# Patient Record
Sex: MALE | Race: WHITE | Employment: OTHER | URBAN - METROPOLITAN AREA
[De-identification: names, ages, dates, MRNs, and addresses within clinical notes are randomized per-mention and may not be internally consistent; named-entity substitution may affect disease eponyms.]

---

## 2017-01-15 ENCOUNTER — APPOINTMENT (OUTPATIENT)
Dept: GENERAL RADIOLOGY | Facility: CLINIC | Age: 52
End: 2017-01-15
Attending: FAMILY MEDICINE
Payer: COMMERCIAL

## 2017-01-15 ENCOUNTER — HOSPITAL ENCOUNTER (EMERGENCY)
Facility: CLINIC | Age: 52
Discharge: HOME | End: 2017-01-15
Attending: FAMILY MEDICINE | Admitting: FAMILY MEDICINE
Payer: COMMERCIAL

## 2017-01-15 VITALS
WEIGHT: 200 LBS | SYSTOLIC BLOOD PRESSURE: 165 MMHG | TEMPERATURE: 96.8 F | HEART RATE: 99 BPM | BODY MASS INDEX: 32.14 KG/M2 | OXYGEN SATURATION: 98 % | RESPIRATION RATE: 18 BRPM | DIASTOLIC BLOOD PRESSURE: 108 MMHG | HEIGHT: 66 IN

## 2017-01-15 DIAGNOSIS — R07.89 CHEST WALL PAIN: ICD-10-CM

## 2017-01-15 DIAGNOSIS — R07.89 ATYPICAL CHEST PAIN: ICD-10-CM

## 2017-01-15 DIAGNOSIS — I10 ESSENTIAL HYPERTENSION: ICD-10-CM

## 2017-01-15 LAB
ALBUMIN SERPL-MCNC: 4.3 G/DL (ref 3.4–5)
ALP SERPL-CCNC: 136 U/L (ref 40–150)
ALT SERPL W P-5'-P-CCNC: 42 U/L (ref 0–70)
ANION GAP SERPL CALCULATED.3IONS-SCNC: 7 MMOL/L (ref 3–14)
AST SERPL W P-5'-P-CCNC: 23 U/L (ref 0–45)
BASOPHILS # BLD AUTO: 0 10E9/L (ref 0–0.2)
BASOPHILS NFR BLD AUTO: 0.3 %
BILIRUB SERPL-MCNC: 0.5 MG/DL (ref 0.2–1.3)
BUN SERPL-MCNC: 16 MG/DL (ref 7–30)
CALCIUM SERPL-MCNC: 8.3 MG/DL (ref 8.5–10.1)
CHLORIDE SERPL-SCNC: 101 MMOL/L (ref 94–109)
CO2 SERPL-SCNC: 33 MMOL/L (ref 20–32)
CREAT SERPL-MCNC: 1.13 MG/DL (ref 0.66–1.25)
D DIMER PPP FEU-MCNC: 0.3 UG/ML FEU (ref 0–0.5)
DIFFERENTIAL METHOD BLD: NORMAL
EOSINOPHIL # BLD AUTO: 0.1 10E9/L (ref 0–0.7)
EOSINOPHIL NFR BLD AUTO: 1.2 %
ERYTHROCYTE [DISTWIDTH] IN BLOOD BY AUTOMATED COUNT: 13.1 % (ref 10–15)
ERYTHROCYTE [SEDIMENTATION RATE] IN BLOOD BY WESTERGREN METHOD: 6 MM/H (ref 0–20)
GFR SERPL CREATININE-BSD FRML MDRD: 68 ML/MIN/1.7M2
GLUCOSE SERPL-MCNC: 95 MG/DL (ref 70–99)
HCT VFR BLD AUTO: 50.6 % (ref 40–53)
HGB BLD-MCNC: 17.6 G/DL (ref 13.3–17.7)
IMM GRANULOCYTES # BLD: 0 10E9/L (ref 0–0.4)
IMM GRANULOCYTES NFR BLD: 0.1 %
LIPASE SERPL-CCNC: 117 U/L (ref 73–393)
LYMPHOCYTES # BLD AUTO: 2.6 10E9/L (ref 0.8–5.3)
LYMPHOCYTES NFR BLD AUTO: 25.2 %
MCH RBC QN AUTO: 32.7 PG (ref 26.5–33)
MCHC RBC AUTO-ENTMCNC: 34.8 G/DL (ref 31.5–36.5)
MCV RBC AUTO: 94 FL (ref 78–100)
MONOCYTES # BLD AUTO: 1.2 10E9/L (ref 0–1.3)
MONOCYTES NFR BLD AUTO: 11.4 %
NEUTROPHILS # BLD AUTO: 6.3 10E9/L (ref 1.6–8.3)
NEUTROPHILS NFR BLD AUTO: 61.8 %
PLATELET # BLD AUTO: 265 10E9/L (ref 150–450)
POTASSIUM SERPL-SCNC: 3.6 MMOL/L (ref 3.4–5.3)
PROT SERPL-MCNC: 8.3 G/DL (ref 6.8–8.8)
RBC # BLD AUTO: 5.39 10E12/L (ref 4.4–5.9)
SODIUM SERPL-SCNC: 141 MMOL/L (ref 133–144)
TROPONIN I SERPL-MCNC: NORMAL UG/L (ref 0–0.04)
WBC # BLD AUTO: 10.1 10E9/L (ref 4–11)

## 2017-01-15 PROCEDURE — 99285 EMERGENCY DEPT VISIT HI MDM: CPT | Mod: 25 | Performed by: FAMILY MEDICINE

## 2017-01-15 PROCEDURE — 96374 THER/PROPH/DIAG INJ IV PUSH: CPT

## 2017-01-15 PROCEDURE — 80053 COMPREHEN METABOLIC PANEL: CPT | Performed by: FAMILY MEDICINE

## 2017-01-15 PROCEDURE — 99285 EMERGENCY DEPT VISIT HI MDM: CPT | Mod: 25

## 2017-01-15 PROCEDURE — 85652 RBC SED RATE AUTOMATED: CPT | Performed by: FAMILY MEDICINE

## 2017-01-15 PROCEDURE — 25000132 ZZH RX MED GY IP 250 OP 250 PS 637: Performed by: FAMILY MEDICINE

## 2017-01-15 PROCEDURE — 84484 ASSAY OF TROPONIN QUANT: CPT | Performed by: FAMILY MEDICINE

## 2017-01-15 PROCEDURE — 25000125 ZZHC RX 250: Performed by: FAMILY MEDICINE

## 2017-01-15 PROCEDURE — 71020 XR CHEST 2 VW: CPT | Mod: TC

## 2017-01-15 PROCEDURE — 85379 FIBRIN DEGRADATION QUANT: CPT | Performed by: FAMILY MEDICINE

## 2017-01-15 PROCEDURE — 93010 ELECTROCARDIOGRAM REPORT: CPT | Performed by: FAMILY MEDICINE

## 2017-01-15 PROCEDURE — 83690 ASSAY OF LIPASE: CPT | Performed by: FAMILY MEDICINE

## 2017-01-15 PROCEDURE — 85025 COMPLETE CBC W/AUTO DIFF WBC: CPT | Performed by: FAMILY MEDICINE

## 2017-01-15 PROCEDURE — 93005 ELECTROCARDIOGRAM TRACING: CPT

## 2017-01-15 RX ORDER — KETOROLAC TROMETHAMINE 30 MG/ML
30 INJECTION, SOLUTION INTRAMUSCULAR; INTRAVENOUS ONCE
Status: COMPLETED | OUTPATIENT
Start: 2017-01-15 | End: 2017-01-15

## 2017-01-15 RX ORDER — LIDOCAINE 40 MG/G
CREAM TOPICAL
Status: DISCONTINUED | OUTPATIENT
Start: 2017-01-15 | End: 2017-01-15 | Stop reason: HOSPADM

## 2017-01-15 RX ORDER — TRIAMTERENE AND HYDROCHLOROTHIAZIDE 37.5; 25 MG/1; MG/1
1 CAPSULE ORAL DAILY
COMMUNITY

## 2017-01-15 RX ORDER — ASPIRIN 81 MG/1
324 TABLET, CHEWABLE ORAL ONCE
Status: COMPLETED | OUTPATIENT
Start: 2017-01-15 | End: 2017-01-15

## 2017-01-15 RX ADMIN — KETOROLAC TROMETHAMINE 30 MG: 30 INJECTION, SOLUTION INTRAMUSCULAR at 18:37

## 2017-01-15 RX ADMIN — ASPIRIN 81 MG CHEWABLE TABLET 324 MG: 81 TABLET CHEWABLE at 18:26

## 2017-01-15 NOTE — ED AVS SNAPSHOT
[facility] Emergency Department    [address] [place] [address]    Phone:  [phone]    Fax:  [phone]                                       Houston Castellon   MRN: [id number]    Department:  [facility] Emergency Department   Date of Visit:  1/15/2017           After Visit Summary Signature Page     I have received my discharge instructions, and my questions have been answered. I have discussed any challenges I see with this plan with the nurse or doctor.    ..........................................................................................................................................  Patient/Patient Representative Signature      ..........................................................................................................................................  Patient Representative Print Name and Relationship to Patient    ..................................................               ................................................  Date                                            Time    ..........................................................................................................................................  Reviewed by Signature/Title    ...................................................              ..............................................  Date                                                            Time

## 2017-01-15 NOTE — ED AVS SNAPSHOT
[facility] Emergency Department    [address]     [place] [address]    Phone:  [phone]    Fax:  [phone]                                       Houston Castellon   MRN: [id number]    Department:  [facility] Emergency Department   Date of Visit:  1/15/2017           Patient Information     Date Of Birth          1965        Your diagnoses for this visit were:     Atypical chest pain     Chest wall pain     Essential hypertension        You were seen by Samuel Marte MD and Samir Ferro MD.      Follow-up Information     Follow up with John Chow MD.    Specialty:  Emergency Medicine    Why:  1-2 weeks    Contact information:    [facility]  [address] [address]  [phone]          Discharge Instructions       Your EKG, chest x-ray and lab work were all reassuring tonight.  This appears to be coming from your chest wall.  This is usually treated with anti-inflammatory such as naproxen or ibuprofen.  Ice or heat may also be helpful.    Try to remember to take at least 10 deep breaths every hour while awake.  Recheck your blood pressure in clinic this week or next.  It was a pleasure visiting with both of you this evening.  I hope this settles down quickly for you.  Stay safe!    Thank you for choosing [facility]. We appreciate the opportunity to meet your urgent medical needs. Please let us know if we could have done anything to make your stay more satisfying.    After discharge, please closely monitor for any new or worsening symptoms. Return to the Emergency Department if you develop any acute worsening signs or symptoms.    If you had lab work, cultures or imaging studies done during your stay, the final results may still be pending. We will call you if your plan of care needs to change. However, if you are not improving as expected, please follow up with your primary care provider or clinic.     Start any prescription  medications that were prescribed to you and take them as directed.     Please see additional handouts that may be pertinent to your condition.        Chest Wall Pain: Costochondritis    The chest pain that you have had today is caused by costochondritis. This condition is caused by an inflammation of the cartilage joining your ribs to your breastbone. It is not caused by heart or lung problems. The inflammation may have been brought on by a blow to the chest, lifting heavy objects, intense exercise, or an illness that made you cough and sneeze. It often occurs during times of emotional stress. It can be painful, but it is not dangerous. It usually goes away in 1 to 2 weeks. But it may happen again. Rarely, a more serious condition may cause symptoms similar to costochondritis. That s why it s important to watch for the warning signs listed below.  Home care  Follow these guidelines when caring for yourself at home:    If you feel that emotional stress is a cause of your condition, try to figure out the sources of that stress. It may not be obvious! Learn ways to deal with the stress in your life. This can include regular exercise, muscle relaxation, meditation, or simply taking time out for yourself. For more information about this, talk with your health care provider. Or go to your local library and look at books on  stress reduction.     You may use acetaminophen or ibuprofen to control pain, unless another pain medicine was prescribed. If you have liver disease or ever had a stomach ulcer, talk with your health care provider before using these medicines.    You can also help ease pain by using a hot, wet compress or heating pad. Use this with or without a medicated skin cream that helps relieves pain.    Do stretching exercise as advised by your provider.    Take any prescribed medicines as directed.  Follow-up care  Follow up with your health care provider, or as advised, if you do not start to get better in the  next 2 days.  When to seek medical advice  Call your health care provider right away if any of these occur:    A change in the type of pain. Call if it feels different, becomes more serious, lasts longer, or spreads into your shoulder, arm, neck, jaw, or back.    Shortness of breath or pain gets worse when you breathe    Weakness, dizziness, or fainting    Cough with dark-colored sputum (phlegm) or blood    Abdominal pain    Dark red or black stools    Fever of 100.4 F (38 C) or higher, or as directed by your health care provider    4331-2719 [employer]. [address] [place], [place] [address]. All rights reserved. This information is not intended as a substitute for professional medical care. Always follow your healthcare professional's instructions.          24 Hour Appointment Hotline       To make an appointment at any [facility], call [phone] ([phone]). If you don't have a family doctor or clinic, we will help you find one. [facility] clinics are conveniently located to serve the needs of you and your family.             Review of your medicines      Our records show that you are taking the medicines listed below. If these are incorrect, please call your family doctor or clinic.        Dose / Directions Last dose taken    AMITRIPTYLINE HCL PO        Refills:  0        PRILOSEC PO        Refills:  0        triamterene-hydrochlorothiazide 37.5-25 MG per capsule   Commonly known as:  DYAZIDE   Dose:  1 capsule        Take 1 capsule by mouth daily   Refills:  0                Procedures and tests performed during your visit     CBC with platelets differential    Comprehensive metabolic panel    D dimer quantitative    EKG 12-lead, tracing only    Erythrocyte sedimentation rate auto    Lipase    Peripheral IV catheter    Troponin I    XR Chest 2 Views      Orders Needing Specimen Collection     None      Pending Results     No orders found from 1/14/2017 to 1/16/2017.           "  Pending Culture Results     No orders found from 2017 to 2017.            Thank you for choosing [facility]       Thank you for choosing [facility] for your care. Our goal is always to provide you with excellent care. Hearing back from our patients is one way we can continue to improve our services. Please take a few minutes to complete the written survey that you may receive in the mail after you visit with us. Thank you!        [employer] Information     [employer] lets you send messages to your doctor, view your test results, renew your prescriptions, schedule appointments and more. To sign up, go to [email or web address] . Click on \"Log in\" on the left side of the screen, which will take you to the Welcome page. Then click on \"Sign up Now\" on the right side of the page.     You will be asked to enter the access code listed below, as well as some personal information. Please follow the directions to create your username and password.     Your access code is: [email or web address]  Expires: 4/15/2017  8:41 PM     Your access code will  in 90 days. If you need help or a new code, please call your [facility] clinic or [phone].        Care EveryWhere ID     This is your Care EveryWhere ID. This could be used by other organizations to access your [facility] medical records  [id number]        After Visit Summary       This is your record. Keep this with you and show to your community pharmacist(s) and doctor(s) at your next visit.                  "

## 2017-01-16 NOTE — DISCHARGE INSTRUCTIONS
Your EKG, chest x-ray and lab work were all reassuring tonight.  This appears to be coming from your chest wall.  This is usually treated with anti-inflammatory such as naproxen or ibuprofen.  Ice or heat may also be helpful.    Try to remember to take at least 10 deep breaths every hour while awake.  Recheck your blood pressure in clinic this week or next.  It was a pleasure visiting with both of you this evening.  I hope this settles down quickly for you.  Stay safe!    Thank you for choosing [facility]. We appreciate the opportunity to meet your urgent medical needs. Please let us know if we could have done anything to make your stay more satisfying.    After discharge, please closely monitor for any new or worsening symptoms. Return to the Emergency Department if you develop any acute worsening signs or symptoms.    If you had lab work, cultures or imaging studies done during your stay, the final results may still be pending. We will call you if your plan of care needs to change. However, if you are not improving as expected, please follow up with your primary care provider or clinic.     Start any prescription medications that were prescribed to you and take them as directed.     Please see additional handouts that may be pertinent to your condition.        Chest Wall Pain: Costochondritis    The chest pain that you have had today is caused by costochondritis. This condition is caused by an inflammation of the cartilage joining your ribs to your breastbone. It is not caused by heart or lung problems. The inflammation may have been brought on by a blow to the chest, lifting heavy objects, intense exercise, or an illness that made you cough and sneeze. It often occurs during times of emotional stress. It can be painful, but it is not dangerous. It usually goes away in 1 to 2 weeks. But it may happen again. Rarely, a more serious condition may cause symptoms similar to costochondritis. That s why  it s important to watch for the warning signs listed below.  Home care  Follow these guidelines when caring for yourself at home:    If you feel that emotional stress is a cause of your condition, try to figure out the sources of that stress. It may not be obvious! Learn ways to deal with the stress in your life. This can include regular exercise, muscle relaxation, meditation, or simply taking time out for yourself. For more information about this, talk with your health care provider. Or go to your local library and look at books on  stress reduction.     You may use acetaminophen or ibuprofen to control pain, unless another pain medicine was prescribed. If you have liver disease or ever had a stomach ulcer, talk with your health care provider before using these medicines.    You can also help ease pain by using a hot, wet compress or heating pad. Use this with or without a medicated skin cream that helps relieves pain.    Do stretching exercise as advised by your provider.    Take any prescribed medicines as directed.  Follow-up care  Follow up with your health care provider, or as advised, if you do not start to get better in the next 2 days.  When to seek medical advice  Call your health care provider right away if any of these occur:    A change in the type of pain. Call if it feels different, becomes more serious, lasts longer, or spreads into your shoulder, arm, neck, jaw, or back.    Shortness of breath or pain gets worse when you breathe    Weakness, dizziness, or fainting    Cough with dark-colored sputum (phlegm) or blood    Abdominal pain    Dark red or black stools    Fever of 100.4 F (38 C) or higher, or as directed by your health care provider    1010-2870 [employer]. [address] [place] [address], [place] [address]. All rights reserved. This information is not intended as a substitute for professional medical care. Always follow your healthcare professional's instructions.

## 2017-01-16 NOTE — ED NOTES
Pt here with chest pain since 0430 this AM. Has been using a Nitro patch for tendonitis and took it off today and now had pain today.

## 2017-01-16 NOTE — ED PROVIDER NOTES
[facility] ED Provider Note   CC:     Chief Complaint   Patient presents with     Chest Pain     HPI:  Houston Castellon is a 51 year old male who presented to the emergency department with acute onset of left lateral chest pain which started this morning on his way to work.  Patient was driving to work when he developed a sharp pain along the lateral left chest wall.  Pain was fairly constant but worse with deep breathing.  Over the last several hours, the pain started to involve the lower chest across the front.  Pain did not radiate into the neck, jaw, or arm.  The pain along the left lateral chest wall does radiate slightly into the back, and is really exacerbated by deep breathing.  Patient feels slightly diaphoretic, but does not have a cough, shortness of breath or nausea.  He has not had any recent calf pain or ankle swelling.  Patient has no known history of diabetes or hyperlipidemia.  He has hypertension and takes amlodipine and potassium supplementation for a history of low potassium levels.  He also takes omeprazole and amitriptyline.  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  He does not ride in the car throughout the day.  He has no prior history of heart attacks, blood clots, and has not this type pain before.  He does not have any upper respiratory symptoms.  He denies fever, chills, cough, nausea, abdominal pain, changes in bowel habits, problems with urination.  He denies any tobacco, alcohol or illicit drug use except for an occasional cigar.  Patient has had a recent right elbow tendinitis, with a cortisone injection and application of a quarter patch of nitroglycerin to help increase circulation to the area.  Additional past medical history of T7 through T9 thoracic vertebral injury when he was in the  which causes chronic pain.  Past surgeries include  "cholecystectomy, skin grafting of the left elbow, and inguinal hernia repair.  Family history significant for his mother dying at age 45 of breast cancer, and his father dying at an early age of COPD.  Patient has not had any recent exertional symptoms.  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    Problem List:  There are no active problems to display for this patient.      MEDS:   Previous Medications    AMITRIPTYLINE HCL PO        AMLODIPINE BESYLATE PO        OMEPRAZOLE (PRILOSEC PO)           ALLERGIES:    Allergies   Allergen Reactions     Esgic [Butalbital-Apap-Caffeine] Other (See Comments)     Jaw locked         Past medical, surgical, family and social histories, triage and nursing notes were all reviewed.    Review of Systems   All other systems were reviewed and are negative    Physical Exam     Vitals were reviewed  Patient Vitals for the past 8 hrs:   BP Temp Temp src Pulse Heart Rate Resp SpO2 Height Weight   01/15/17 1832 (!) 152/95 mmHg - - - 98 - 94 % - -   01/15/17 1814 (!) 169/104 mmHg 96.8  F (36  C) Temporal 99 - 16 97 % 1.676 m (5' 6\") 90.719 kg (200 lb)     GENERAL APPEARANCE: Alert, mild distress due to pain  FACE: normal facies  EYES: Pupils are equal  HENT: normal external exam  NECK: no adenopathy or asymmetry  RESP: normal respiratory effort; clear breath sounds bilaterally  CHEST: Left lateral chest wall tenderness just below the axilla; no appreciable rash  CV: regular rate and rhythm; no significant murmurs, gallops or rubs  ABD: soft, diffuse epigastric tenderness; no rebound or guarding; bowel sounds are normal  MS: no gross deformities noted; normal muscle tone.  EXT: No calf tenderness or pitting edema  SKIN: Slightly diaphoretic  NEURO: no facial droop; no focal deficits, speech is normal  PSYCH: normal mood and affect      Available Lab/Imaging Results     Results for orders placed or performed during the hospital encounter of 01/15/17 (from the past 24 hour(s)) "   CBC with platelets differential   Result Value Ref Range    WBC 10.1 4.0 - 11.0 10e9/L    RBC Count 5.39 4.4 - 5.9 10e12/L    Hemoglobin 17.6 13.3 - 17.7 g/dL    Hematocrit 50.6 40.0 - 53.0 %    MCV 94 78 - 100 fl    MCH 32.7 26.5 - 33.0 pg    MCHC 34.8 31.5 - 36.5 g/dL    RDW 13.1 10.0 - 15.0 %    Platelet Count 265 150 - 450 10e9/L    Diff Method Automated Method     % Neutrophils 61.8 %    % Lymphocytes 25.2 %    % Monocytes 11.4 %    % Eosinophils 1.2 %    % Basophils 0.3 %    % Immature Granulocytes 0.1 %    Absolute Neutrophil 6.3 1.6 - 8.3 10e9/L    Absolute Lymphocytes 2.6 0.8 - 5.3 10e9/L    Absolute Monocytes 1.2 0.0 - 1.3 10e9/L    Absolute Eosinophils 0.1 0.0 - 0.7 10e9/L    Absolute Basophils 0.0 0.0 - 0.2 10e9/L    Abs Immature Granulocytes 0.0 0 - 0.4 10e9/L   XR Chest 2 Views    Narrative    CHEST TWO VIEWS 1/15/2017 6:46 PM     HISTORY: Chest pain.    COMPARISON: None.    FINDINGS: There are no acute infiltrates. The cardiac silhouette is  not enlarged. Pulmonary vasculature is unremarkable.      Impression    IMPRESSION: No acute disease.     EKG: Normal sinus rhythm with heart rate of 94.  Incomplete right bundle branch block.  No acute ST-T wave changes.  Normal intervals and axis.  No previous EKG for comparison.      Impression     Final diagnoses:   Atypical chest pain       ED Course & Medical Decision Making   Houston Castellon is a 51 year old male who presented to the emergency department with an atypical sharp chest pain that started this morning along the left lateral chest wall.  Patient states that the pain is associated with deep breathing, making the pain more intense.  His pain level currently is 3/10, but with deep breathing the pain is as bad as 8/10.  Patient started to develop epigastric or lower chest pain a few hours ago.  Patient has never had this type pain before.  Patient was seen shortly after arrival.  Temperature is 96.8 and blood pressure is 169/104.  Heart rate is 99,  oxygen saturation of 99%.  Patient does have some reproducible lateral chest wall tenderness as well as epigastric tenderness.  Patient received aspirin 324 mg, and a dose of Toradol.  His initial EKG revealed no acute ST-T wave changes.  CBC, comprehensive metabolic panel, troponin, ESR, lipase level, and chest x-ray been ordered.  Patient will be signed out to Dr. Ferro at the change of shift.            This note was completed in part using Dragon voice recognition, and may contain word and grammatical errors.         Samuel Marte MD  01/15/17 1901

## 2017-01-16 NOTE — ED PROVIDER NOTES
"Patient was signed out to me at change of shift by Dr. Marte to follow up on his labs and CXR results.    From Dr Marte's note:    \"Houston Castellon is a 51 year old male who presented to the emergency department with an atypical sharp chest pain that started this morning along the left lateral chest wall.  Patient states that the pain is associated with deep breathing, making the pain more intense.  His pain level currently is 3/10, but with deep breathing the pain is as bad as 8/10.  Patient started to develop epigastric or lower chest pain a few hours ago.  Patient has never had this type pain before.  Patient was seen shortly after arrival.  Temperature is 96.8 and blood pressure is 169/104.  Heart rate is 99, oxygen saturation of 99%.  Patient does have some reproducible lateral chest wall tenderness as well as epigastric tenderness.  Patient received aspirin 324 mg, and a dose of Toradol.  His initial EKG revealed no acute ST-T wave changes.  CBC, comprehensive metabolic panel, troponin, ESR, lipase level, and chest x-ray been ordered.  Patient will be signed out to Dr. Ferro at the change of shift.\"    Results for orders placed or performed during the hospital encounter of 01/15/17 (from the past 24 hour(s))   CBC with platelets differential   Result Value Ref Range    WBC 10.1 4.0 - 11.0 10e9/L    RBC Count 5.39 4.4 - 5.9 10e12/L    Hemoglobin 17.6 13.3 - 17.7 g/dL    Hematocrit 50.6 40.0 - 53.0 %    MCV 94 78 - 100 fl    MCH 32.7 26.5 - 33.0 pg    MCHC 34.8 31.5 - 36.5 g/dL    RDW 13.1 10.0 - 15.0 %    Platelet Count 265 150 - 450 10e9/L    Diff Method Automated Method     % Neutrophils 61.8 %    % Lymphocytes 25.2 %    % Monocytes 11.4 %    % Eosinophils 1.2 %    % Basophils 0.3 %    % Immature Granulocytes 0.1 %    Absolute Neutrophil 6.3 1.6 - 8.3 10e9/L    Absolute Lymphocytes 2.6 0.8 - 5.3 10e9/L    Absolute Monocytes 1.2 0.0 - 1.3 10e9/L    Absolute Eosinophils 0.1 0.0 - 0.7 10e9/L    Absolute Basophils 0.0 " 0.0 - 0.2 10e9/L    Abs Immature Granulocytes 0.0 0 - 0.4 10e9/L   Comprehensive metabolic panel   Result Value Ref Range    Sodium 141 133 - 144 mmol/L    Potassium 3.6 3.4 - 5.3 mmol/L    Chloride 101 94 - 109 mmol/L    Carbon Dioxide 33 (H) 20 - 32 mmol/L    Anion Gap 7 3 - 14 mmol/L    Glucose 95 70 - 99 mg/dL    Urea Nitrogen 16 7 - 30 mg/dL    Creatinine 1.13 0.66 - 1.25 mg/dL    GFR Estimate 68 >60 mL/min/1.7m2    GFR Estimate If Black 82 >60 mL/min/1.7m2    Calcium 8.3 (L) 8.5 - 10.1 mg/dL    Bilirubin Total 0.5 0.2 - 1.3 mg/dL    Albumin 4.3 3.4 - 5.0 g/dL    Protein Total 8.3 6.8 - 8.8 g/dL    Alkaline Phosphatase 136 40 - 150 U/L    ALT 42 0 - 70 U/L    AST 23 0 - 45 U/L   Troponin I   Result Value Ref Range    Troponin I ES  0.000 - 0.045 ug/L     <0.015  The 99th percentile for upper reference range is 0.045 ug/L.  Troponin values in   the range of 0.045 - 0.120 ug/L may be associated with risks of adverse   clinical events.     Erythrocyte sedimentation rate auto   Result Value Ref Range    Sed Rate 6 0 - 20 mm/h   Lipase   Result Value Ref Range    Lipase 117 73 - 393 U/L   D dimer quantitative   Result Value Ref Range    D Dimer 0.3 0.0 - 0.50 ug/ml FEU   XR Chest 2 Views    Narrative    CHEST TWO VIEWS 1/15/2017 6:46 PM     HISTORY: Chest pain.    COMPARISON: None.    FINDINGS: There are no acute infiltrates. The cardiac silhouette is  not enlarged. Pulmonary vasculature is unremarkable.      Impression    IMPRESSION: No acute disease.        Chest x-ray was normal.  Troponin was undetectable.  D-dimer was normal at 0.3.      Appears to be having chest wall pain.  I discussed his results with the patient and his wife.  Without lots of bad things tonight.  Discomfort and expect gradual improvement over the next few weeks.  He will return if his symptoms change or worsen or if he has any concerns.  I did encourage him to follow up with his primary physician in regards to his elevated blood  pressure.          Impression/Plan:      (R07.89) Atypical chest pain          (R07.89) Chest wall pain    (I10) Essential hypertension    Plan: Ibuprofen or naproxen as needed.  Ice or heat may be helpful.  Recheck in clinic in regards to blood pressure management.  See discharge instructions.           Samir Ferro MD  01/15/17 0069

## 2020-10-24 ENCOUNTER — TRANSFERRED RECORDS (OUTPATIENT)
Dept: HEALTH INFORMATION MANAGEMENT | Facility: CLINIC | Age: 55
End: 2020-10-24

## 2020-11-03 ENCOUNTER — HOSPITAL ENCOUNTER (OUTPATIENT)
Dept: CT IMAGING | Facility: CLINIC | Age: 55
Discharge: HOME | End: 2020-11-03
Attending: NURSE PRACTITIONER | Admitting: NURSE PRACTITIONER
Payer: COMMERCIAL

## 2020-11-03 DIAGNOSIS — T14.8XXA FRACTURE: ICD-10-CM

## 2020-11-03 PROCEDURE — 73700 CT LOWER EXTREMITY W/O DYE: CPT | Mod: RT

## 2021-02-03 ENCOUNTER — HOSPITAL ENCOUNTER (OUTPATIENT)
Dept: PHYSICAL THERAPY | Facility: CLINIC | Age: 56
Setting detail: THERAPIES SERIES
End: 2021-02-03
Attending: ORTHOPAEDIC SURGERY
Payer: COMMERCIAL

## 2021-02-03 PROCEDURE — 97161 PT EVAL LOW COMPLEX 20 MIN: CPT | Mod: GP | Performed by: PHYSICAL THERAPIST

## 2021-02-03 PROCEDURE — 97110 THERAPEUTIC EXERCISES: CPT | Mod: GP | Performed by: PHYSICAL THERAPIST

## 2021-02-03 PROCEDURE — 97140 MANUAL THERAPY 1/> REGIONS: CPT | Mod: GP | Performed by: PHYSICAL THERAPIST

## 2021-02-04 NOTE — PROGRESS NOTES
02/03/21 1421   General Information   Type of Visit Initial OP Ortho PT Evaluation   Start of Care Date 02/03/21   Referring Physician Dr. Lionel Bravo MD   Orders Evaluate and Treat   Orders Comment Rehabilitation Services: Evaluate and treat patient as needed and initiate a home exercise program.  Gait and balance, calf stretching and strengthening.  Frequency: 2-3 times per week, for 4-6 weeks.  Please send progress report.     Date of Order 01/04/21   Certification Required? No   Medical Diagnosis Diagnosis: s/p right ORIF distal tibia and tibial shaft and ORIF syndesmosis DOS 11/12/2020   Surgical/Medical history reviewed Yes   Precautions/Limitations no known precautions/limitations   Weight-Bearing Status - LUE full weight-bearing   Weight-Bearing Status - RUE full weight-bearing   Weight-Bearing Status - LLE full weight-bearing   Weight-Bearing Status - RLE other (see comments)  (Unknown at this time, awaiting clarification)   General Information Comments PMH: Esophageal reflux, Migraine, Obesity, Obstructive sleep apnea, Wrist arthritis, Adenomatous polyp of colon, Palmar fascial fibromatosis, Radial tunnel syndrome, Hypertension, Hypercholesterolemia, Lateral epicondylitis of right elbow, Hx of dysplastic nevus, Hx of basal cell carcinoma, CPAP dependence, Closed extra-articular fracture of distal end of right tibia with routine healing, Fall from roof, Closed wedge compression fracture of T8 vertebra with routine healing.  PSH: Laparoscopy surgical cholect 3/2005       Present No   Body Part(s)   Body Part(s) Ankle/Foot   Presentation and Etiology   Pertinent history of current problem (include personal factors and/or comorbidities that impact the POC) Patient was on the roof of his pole barn and fell 30 feet down onto concrete floor on 10/24/2020.  He was at [facility] for 3 days.  Images revealed a right tibia fracture and age indeterminate T8 fx.  Tibia fracture was  splinted in the ED, deemed to be non-operative and was instructed to follow up with orthopedics outpatient for casting.  Neurosurgery deemed his injury nonoperative and not requiring a brace.  HH or OP therapy was recommended upon DC from the hospital, but patient declined.  He underwent ORIF of right distal tibia and tibial shaft and ORIF syndesmosis on 11/12/2020.  He was NWB until his follow up with the surgeon on 1/4/21.  Per patient report, he believes he was supposed to remain NWB for another two weeks and then slowly add 25% WB each week until he was full WB as of 2/8/21.  He's not entirely sure of this though (will follow up with surgeon for clarification).  He has currently been utilizing axillary crutches for ambulation.  Reports no pain at rest, increased pain with weight bearing and motion.     Impairments A. Pain;B. Decreased WB tolerance;C. Swelling;D. Decreased ROM;E. Decreased flexibility;F. Decreased strength and endurance;G. Impaired balance;H. Impaired gait   Functional Limitations perform activities of daily living;perform desired leisure / sports activities   Symptom Location Top of lower leg/ankle    How/Where did it occur With a fall   Onset date of current episode/exacerbation 10/24/20  (Surgery 11/12/2020)   Chronicity New   Pain rating (0-10 point scale) Best (/10);Worst (/10)  (Currently 0/10 )   Best (/10) 0/10   Worst (/10) 8/10   Pain quality H. Other   Pain quality comment Sharp, stabbing    Frequency of pain/symptoms C. With activity   Pain/symptoms exacerbated by M. Other   Pain exacerbation comment Moving the foot/ankle, putting weight on it    Pain/symptoms eased by K. Other   Pain eased by comment Resting/not moving the foot/ankle   Prior Level of Function   Prior Level of Function-Mobility Independent   Prior Level of Function-ADLs Independent   Current Level of Function   Patient role/employment history F. Retired  ( )   [facility] environment [facility]    Home/community accessibility No stairs - all one level    Current equipment-Gait/Locomotion Axillary crutches   Current equipment-ADL None   Fall Risk Screen   Fall screen completed by PT   Have you fallen 2 or more times in the past year? No   Have you fallen and had an injury in the past year? Yes   Is patient a fall risk? No   Fall screen comments 1 fall off roof of pole barn in Oct 2020, no other falls    Abuse Screen (yes response referral indicated)   Feels Unsafe at Home or Work/School no   Feels Threatened by Someone no   Does Anyone Try to Keep You From Having Contact with Others or Doing Things Outside Your Home? no   Physical Signs of Abuse Present no   Functional Scales   Functional Scales Other   Other Scales  LEFS score 25/80 (31.25%)   Ankle/Foot Objective Findings   Side (if bilateral, select both right and left) Right   Observation Presents wearing boot on R LE   Integumentary Scar intact, appears to be healing well, appropriate scar mobility.  Increased swelling on R foot, no pitting.   Gait/Locomotion Antalgic, use of axillary crutches, reports he is not putting full weight through R LE   Ankle/Foot Strength Comments Strength not assessed due to post op status    Ankle/Foot Special Tests Comments Special tests not performed due to post op status    Palpation Some increased tenderness throughout R foot and ankle   Accessory Motion/Joint Mobility Increased stiffness in R ankle    Right DF (Knee Ext) AROM -5 deg (measured in long sitting)    Right PF AROM 25 deg (measured in long sitting)   Right DF (Knee Ext) PROM 4 deg (measured in long sitting)   Right PF PROM 30 deg (measured in long sitting)    Planned Therapy Interventions   Planned Therapy Interventions balance training;gait training;joint mobilization;manual therapy;neuromuscular re-education;ROM;strengthening;stretching   Planned Modality Interventions   Planned Modality Interventions Cryotherapy   Planned Modality Interventions Comments  Modalities as needed for symptom relief    Clinical Impression   Criteria for Skilled Therapeutic Interventions Met yes, treatment indicated   PT Diagnosis R LE pain, decreased R ankle ROM, decreased R LE strength, gait deviations, balance deficits, increased muscle tightness    Influenced by the following impairments R LE pain, decreased R ankle ROM, decreased R LE strength, gait deviations, balance deficits, increased muscle tightness    Functional limitations due to impairments Requires use of AD for ambulation, decreased activity tolerance, increased need for assistance with ADLs   Clinical Presentation Stable/Uncomplicated   Clinical Presentation Rationale Based on observation, history, evaluation and clinical judgment.    Clinical Decision Making (Complexity) Low complexity   Therapy Frequency 2 times/Week   Predicted Duration of Therapy Intervention (days/wks) 12 weeks   Risk & Benefits of therapy have been explained Yes   Patient, Family & other staff in agreement with plan of care Yes   Clinical Impression Comments Patient presents with signs and symptoms consistent with referring diagnosis of s/p right ORIF distal tibia and tibial shaft and ORIF syndesmosis.  He demonstrates R LE pain, decreased R ankle ROM, decreased R LE strength, gait deviations, balance deficits, increased muscle tightness.  Functionally, he has increased pain and difficulty with ambulation (requires use of AD), activity tolerance, and requires increased assistance for ADLs.  Patient will benefit from skilled physical therapy interventions to address physical impairments for return to functional activities.    Education Assessment   Preferred Learning Style Listening;Reading;Demonstration;Pictures/video   Barriers to Learning No barriers   ORTHO GOALS   PT Ortho Eval Goals 1;2;3   Ortho Goal 1   Goal Identifier 1   Goal Description Patient will increase LEFS score from 25/80 (32.15%) to at least 72/80 in order to demonstrate at least  90% maximal function in order to facilitate return to prior level of function.     Target Date 04/27/21   Ortho Goal 2   Goal Identifier 2   Goal Description Patient will increase R ankle DF AROM from -5 deg to at least 15 deg and will increase R ankle PF AROM from 25 deg to at least 25 deg in order to facilitate return to PLOF.    Target Date 04/27/21   Ortho Goal 3   Goal Identifier 3   Goal Description Patient will demonstrate ability to ambulate independently and with normalized, painfree gait pattern in order to facilitate return to PLOF.     Target Date 04/27/21   Total Evaluation Time   PT Eval, Low Complexity Minutes (85160) 25         Samira Bryant, PT, DPT, CLT-[facility]  Physical Therapist     Phone: [phone]  Email: [email or web address]

## 2021-02-09 ENCOUNTER — HOSPITAL ENCOUNTER (OUTPATIENT)
Dept: PHYSICAL THERAPY | Facility: CLINIC | Age: 56
Setting detail: THERAPIES SERIES
End: 2021-02-09
Attending: ORTHOPAEDIC SURGERY
Payer: COMMERCIAL

## 2021-02-09 PROCEDURE — 97110 THERAPEUTIC EXERCISES: CPT | Mod: GP | Performed by: PHYSICAL THERAPIST

## 2021-02-09 PROCEDURE — 97140 MANUAL THERAPY 1/> REGIONS: CPT | Mod: GP | Performed by: PHYSICAL THERAPIST

## 2021-02-11 ENCOUNTER — HOSPITAL ENCOUNTER (OUTPATIENT)
Dept: PHYSICAL THERAPY | Facility: CLINIC | Age: 56
Setting detail: THERAPIES SERIES
End: 2021-02-11
Attending: ORTHOPAEDIC SURGERY
Payer: COMMERCIAL

## 2021-02-11 PROCEDURE — 97140 MANUAL THERAPY 1/> REGIONS: CPT | Mod: GP | Performed by: PHYSICAL THERAPIST

## 2021-02-11 PROCEDURE — 97110 THERAPEUTIC EXERCISES: CPT | Mod: GP | Performed by: PHYSICAL THERAPIST

## 2021-02-16 ENCOUNTER — HOSPITAL ENCOUNTER (OUTPATIENT)
Dept: PHYSICAL THERAPY | Facility: CLINIC | Age: 56
Setting detail: THERAPIES SERIES
End: 2021-02-16
Attending: ORTHOPAEDIC SURGERY
Payer: COMMERCIAL

## 2021-02-16 PROCEDURE — 97140 MANUAL THERAPY 1/> REGIONS: CPT | Mod: GP | Performed by: PHYSICAL THERAPIST

## 2021-02-16 PROCEDURE — 97110 THERAPEUTIC EXERCISES: CPT | Mod: GP | Performed by: PHYSICAL THERAPIST

## 2021-02-16 PROCEDURE — 97116 GAIT TRAINING THERAPY: CPT | Mod: GP | Performed by: PHYSICAL THERAPIST

## 2021-02-23 ENCOUNTER — HOSPITAL ENCOUNTER (OUTPATIENT)
Dept: PHYSICAL THERAPY | Facility: CLINIC | Age: 56
Setting detail: THERAPIES SERIES
End: 2021-02-23
Attending: ORTHOPAEDIC SURGERY
Payer: COMMERCIAL

## 2021-02-23 PROCEDURE — 97140 MANUAL THERAPY 1/> REGIONS: CPT | Mod: GP | Performed by: PHYSICAL THERAPIST

## 2021-02-23 PROCEDURE — 97110 THERAPEUTIC EXERCISES: CPT | Mod: GP | Performed by: PHYSICAL THERAPIST

## 2021-02-23 PROCEDURE — 97116 GAIT TRAINING THERAPY: CPT | Mod: GP | Performed by: PHYSICAL THERAPIST

## 2021-02-25 ENCOUNTER — HOSPITAL ENCOUNTER (OUTPATIENT)
Dept: PHYSICAL THERAPY | Facility: CLINIC | Age: 56
Setting detail: THERAPIES SERIES
End: 2021-02-25
Attending: ORTHOPAEDIC SURGERY
Payer: COMMERCIAL

## 2021-02-25 PROCEDURE — 97110 THERAPEUTIC EXERCISES: CPT | Mod: GP | Performed by: PHYSICAL THERAPIST

## 2021-02-25 PROCEDURE — 97140 MANUAL THERAPY 1/> REGIONS: CPT | Mod: GP | Performed by: PHYSICAL THERAPIST

## 2021-03-02 ENCOUNTER — HOSPITAL ENCOUNTER (OUTPATIENT)
Dept: PHYSICAL THERAPY | Facility: CLINIC | Age: 56
Setting detail: THERAPIES SERIES
End: 2021-03-02
Attending: ORTHOPAEDIC SURGERY
Payer: COMMERCIAL

## 2021-03-02 PROCEDURE — 97110 THERAPEUTIC EXERCISES: CPT | Mod: GP | Performed by: PHYSICAL THERAPIST

## 2021-03-02 PROCEDURE — 97140 MANUAL THERAPY 1/> REGIONS: CPT | Mod: GP | Performed by: PHYSICAL THERAPIST

## 2021-03-04 ENCOUNTER — HOSPITAL ENCOUNTER (OUTPATIENT)
Dept: PHYSICAL THERAPY | Facility: CLINIC | Age: 56
Setting detail: THERAPIES SERIES
End: 2021-03-04
Attending: ORTHOPAEDIC SURGERY
Payer: COMMERCIAL

## 2021-03-04 PROCEDURE — 97140 MANUAL THERAPY 1/> REGIONS: CPT | Mod: GP | Performed by: PHYSICAL THERAPIST

## 2021-03-04 PROCEDURE — 97110 THERAPEUTIC EXERCISES: CPT | Mod: GP | Performed by: PHYSICAL THERAPIST

## 2021-03-04 NOTE — PROGRESS NOTES
Outpatient Physical Therapy Progress Note     Patient: Houston Castellon  : 1965    Beginning/End Dates of Reporting Period:  2/3/2021 to 3/4/2021    Referring Provider: Dr. Lionel Bravo MD    Therapy Diagnosis: R LE pain, decreased R ankle ROM, decreased R LE strength, gait deviations, balance deficits, increased muscle tightness      Client Self Report: Presents again wearing two shoes and using SEC.  Still unable to tie the laces on his R shoe because of the increased swelling (was unable to tie the laces during previous session too, but prior to that they had been tied).  Spent about 4 hours out in the garage yesterday, on his feet the whole time.  Felt okay after last session Tuesday - went home and put some ice on it, elevated it, rested it, and it felt better.  Overall feeling better in the last month since therapy started - feels like things are loosening up and getting stretched out, especially in the calf.      Objective Measurements:  Objective Measure: Pain   Details: 2/10 R lateral and medial malleolus     Objective Measure: LEFS  Details: Lower Extremity Functional Scale (LEFS) assesses the patients level of difficulty with various activities. The higher the score, the greater the level of function a patient demonstrates. Pt scored 40 points out of 80 possible indicating patient is at 50% of maximal function. MCID is 9 points. LEFS is validated for patients 18 years and older, and if completed by a younger patient, is done to help determine functional deficits and activity limitations for goal use.    Objective Measure: R ankle AROM   Details: Prior to tx: DF 0 deg, PF 36 deg.  After manual tx: DF 2 deg, PF 43 deg        Goals:  Goal Identifier 1   Goal Description Patient will increase LEFS score from 25/80 (32.15%) to at least 72/80 in order to demonstrate at least 90% maximal function in order to facilitate return to prior level of function.     Target Date 21   Date Met       Progress: Goal not met.  LEFS score on 3/4/21 = 40/80 (50%)     Goal Identifier 2   Goal Description Patient will increase R ankle DF AROM from -5 deg to at least 15 deg and will increase R ankle PF AROM from 25 deg to at least 50 deg in order to facilitate return to PLOF.    Target Date 04/27/21   Date Met      Progress: Goal not met.  On 3/4/21, R ankle DF AROM was 2 deg and R ankle PF AROM was 43 deg (both measured after treatment)     Goal Identifier 3   Goal Description Patient will demonstrate ability to ambulate independently and with normalized, painfree gait pattern in order to facilitate return to PLOF.     Target Date 04/27/21   Date Met      Progress: Goal not met.  Patient continues to require use of SEC, demonstrates antalgic gait with decreased WB and stance time on R LE.       Progress Toward Goals:   Progress this reporting period: Patient demonstrates improvement and progress towards goals since initial evaluation on 2/3/21.  LEFS score has improved from 25/80 (32.15%) to 40/80 (50%), indicating increased functional abilities.  R ankle AROM has improved from -5 deg to 2 deg (for DF) and from 25 deg to 43 deg (for PF).  He has progressed to ambulation and WB without use of the boot or crutches, continues to use the cane most of the time, but for short bouts of ambulation at home he does not need to use it.  Continues to demonstrate antalgic gait pattern with decreased gait speed, decreased stance time on R LE as compared to L LE.  Improvement noted with gait pattern and decreased lateral toe out deviation of R foot.  Calf tightness has decreased, but continues to remain one of the main focuses of therapy, in addition to increasing WB time and tolerance, increasing ROM and mobility.  Patient is compliant with HEP and therapy sessions, continued improvement is anticipated.            Plan:  Continue therapy per current plan of care.  Plan to continue to see patient 2x/week.       Discharge:  Sarai Bryant PT, DPT, RICARDO GOMEZ [facility]  Physical Therapist     Phone: [phone]  Email: [email or web address]

## 2021-03-09 ENCOUNTER — HOSPITAL ENCOUNTER (OUTPATIENT)
Dept: PHYSICAL THERAPY | Facility: CLINIC | Age: 56
Setting detail: THERAPIES SERIES
End: 2021-03-09
Attending: ORTHOPAEDIC SURGERY
Payer: COMMERCIAL

## 2021-03-09 PROCEDURE — 97110 THERAPEUTIC EXERCISES: CPT | Mod: GP | Performed by: PHYSICAL THERAPIST

## 2021-03-09 PROCEDURE — 97140 MANUAL THERAPY 1/> REGIONS: CPT | Mod: GP | Performed by: PHYSICAL THERAPIST

## 2021-03-11 ENCOUNTER — HOSPITAL ENCOUNTER (OUTPATIENT)
Dept: PHYSICAL THERAPY | Facility: CLINIC | Age: 56
Setting detail: THERAPIES SERIES
End: 2021-03-11
Attending: ORTHOPAEDIC SURGERY
Payer: COMMERCIAL

## 2021-03-11 PROCEDURE — 97110 THERAPEUTIC EXERCISES: CPT | Mod: GP | Performed by: PHYSICAL THERAPIST

## 2021-03-16 ENCOUNTER — HOSPITAL ENCOUNTER (OUTPATIENT)
Dept: PHYSICAL THERAPY | Facility: CLINIC | Age: 56
Setting detail: THERAPIES SERIES
End: 2021-03-16
Attending: ORTHOPAEDIC SURGERY
Payer: COMMERCIAL

## 2021-03-16 PROCEDURE — 97110 THERAPEUTIC EXERCISES: CPT | Mod: GP | Performed by: PHYSICAL THERAPIST

## 2021-03-23 ENCOUNTER — HOSPITAL ENCOUNTER (OUTPATIENT)
Dept: PHYSICAL THERAPY | Facility: CLINIC | Age: 56
Setting detail: THERAPIES SERIES
End: 2021-03-23
Attending: ORTHOPAEDIC SURGERY
Payer: COMMERCIAL

## 2021-03-23 PROCEDURE — 97110 THERAPEUTIC EXERCISES: CPT | Mod: GP | Performed by: PHYSICAL THERAPIST

## 2021-04-06 ENCOUNTER — HOSPITAL ENCOUNTER (OUTPATIENT)
Dept: PHYSICAL THERAPY | Facility: CLINIC | Age: 56
Setting detail: THERAPIES SERIES
End: 2021-04-06
Attending: ORTHOPAEDIC SURGERY
Payer: COMMERCIAL

## 2021-04-06 PROCEDURE — 97110 THERAPEUTIC EXERCISES: CPT | Mod: GP | Performed by: PHYSICAL THERAPIST

## 2021-04-08 ENCOUNTER — HOSPITAL ENCOUNTER (OUTPATIENT)
Dept: PHYSICAL THERAPY | Facility: CLINIC | Age: 56
Setting detail: THERAPIES SERIES
End: 2021-04-08
Attending: ORTHOPAEDIC SURGERY
Payer: COMMERCIAL

## 2021-04-08 PROCEDURE — 97110 THERAPEUTIC EXERCISES: CPT | Mod: GP | Performed by: PHYSICAL THERAPIST

## 2021-04-15 ENCOUNTER — HOSPITAL ENCOUNTER (OUTPATIENT)
Dept: PHYSICAL THERAPY | Facility: CLINIC | Age: 56
Setting detail: THERAPIES SERIES
End: 2021-04-15
Attending: ORTHOPAEDIC SURGERY
Payer: COMMERCIAL

## 2021-04-15 PROCEDURE — 97110 THERAPEUTIC EXERCISES: CPT | Mod: GP | Performed by: PHYSICAL THERAPIST

## 2021-04-21 ENCOUNTER — HOSPITAL ENCOUNTER (OUTPATIENT)
Dept: PHYSICAL THERAPY | Facility: CLINIC | Age: 56
Setting detail: THERAPIES SERIES
End: 2021-04-21
Attending: ORTHOPAEDIC SURGERY
Payer: COMMERCIAL

## 2021-04-21 PROCEDURE — 97140 MANUAL THERAPY 1/> REGIONS: CPT | Mod: GP | Performed by: PHYSICAL THERAPIST

## 2021-04-21 PROCEDURE — 97110 THERAPEUTIC EXERCISES: CPT | Mod: GP | Performed by: PHYSICAL THERAPIST

## 2021-04-27 ENCOUNTER — HOSPITAL ENCOUNTER (OUTPATIENT)
Dept: PHYSICAL THERAPY | Facility: CLINIC | Age: 56
Setting detail: THERAPIES SERIES
End: 2021-04-27
Attending: ORTHOPAEDIC SURGERY
Payer: COMMERCIAL

## 2021-04-27 PROCEDURE — 97110 THERAPEUTIC EXERCISES: CPT | Mod: GP | Performed by: PHYSICAL THERAPIST

## 2021-04-27 PROCEDURE — 97140 MANUAL THERAPY 1/> REGIONS: CPT | Mod: GP | Performed by: PHYSICAL THERAPIST

## 2021-04-27 NOTE — PROGRESS NOTES
"Outpatient Physical Therapy Progress Note     Patient: Houston Castellon  : 1965    Beginning/End Dates of Reporting Period:  3/4/2021 to 2021    Referring Provider: Dr. Lionel Bravo MD    Therapy Diagnosis: R LE pain, decreased R ankle ROM, decreased R LE strength, gait deviations, balance deficits, increased muscle tightness      Client Self Report: Is feeling pain and soreness today - had been standing for the 4 hours prior to session.  Reports continued calf tightness - has been using Biofreeze on his calf at home the last few days - that seems to have helped.  Feels like he's doing better overall - his wife mentioned recently that his walking seems to be getting better.      Objective Measurements:  Objective Measure: Pain   Details: 2/10 - \"right in my ankle\" - start of session     Objective Measure: LEFS  Details: Lower Extremity Functional Scale (LEFS) assesses the patients level of difficulty with various activities. The higher the score, the greater the level of function a patient demonstrates. Pt scored 53 points out of 80 possible indicating patient is at 66.25% of maximal function. MCID is 9 points. LEFS is validated for patients 18 years and older, and if completed by a younger patient, is done to help determine functional deficits and activity limitations for goal use.    Objective Measure: R ankle AROM   Details: DF 3 deg, PF 43 deg - measured in long sitting after manual tx       Goals:  Goal Identifier 1   Goal Description Patient will increase LEFS score from 25/80 (32.15%) to at least 72/80 in order to demonstrate at least 90% maximal function in order to facilitate return to prior level of function.     Target Date 21   Date Met      Progress: Goal not met.  LEFS score on 21 = 53/80 (66.25%)     Goal Identifier 2   Goal Description Patient will increase R ankle DF AROM from -5 deg to at least 15 deg and will increase R ankle PF AROM from 25 deg to at least 25 deg in " order to facilitate return to PLOF.    Target Date 06/06/21   Date Met      Progress: Goal not met.  On 4/27/21, R ankle DF AROM was 3 deg and R ankle PF AROM was 43 deg (both measured in long sitting after manual tx)      Goal Identifier 3   Goal Description Patient will demonstrate ability to ambulate independently and with normalized, painfree gait pattern in order to facilitate return to PLOF.     Target Date 06/06/21   Date Met      Progress: Goal not met.  Patient has progressed to ambulation without cane, but continues to demonstrate slight antalgic gait with decreased WB and stance time on R LE, continues to demonstrate lateral toe out deviation.         Progress Toward Goals:   Progress this reporting period: Patient demonstrates moderate amount of improvement since last progress note on 3/4/21.  LEFS score has improved from 40/80 (50%) to 53/80 (66.25%), indicating increased functional abilities.  Has progressed to ambulation without use of cane, but continues to demonstrate antalgic gait with decreased WB and stance time on R LE, lateral toe out deviation.  Continues to demonstrate calf tightness and limitations in AROM, which has not increased significantly, only increasing 1 deg in DF and 2 deg in PF as compared to previous progress note on 3/4/21.  Continues to report soreness and pain with prolonged periods of standing, but otherwise seems to be well controlled.          Plan:  Continue therapy per current plan of care.  Patient being seen 1x/week, plan to continue focus on increasing AROM for PF and DF, as well as gait deviations and standing tolerance.      Discharge:  Sarai Bryant PT, DPT, CLT-[facility]  [facility]  Physical Therapist     Phone: [phone]  Email: [email or web address]

## 2021-05-04 ENCOUNTER — HOSPITAL ENCOUNTER (OUTPATIENT)
Dept: PHYSICAL THERAPY | Facility: CLINIC | Age: 56
Setting detail: THERAPIES SERIES
End: 2021-05-04
Attending: ORTHOPAEDIC SURGERY
Payer: COMMERCIAL

## 2021-05-04 PROCEDURE — 97110 THERAPEUTIC EXERCISES: CPT | Mod: GP | Performed by: PHYSICAL THERAPIST

## 2021-05-11 ENCOUNTER — HOSPITAL ENCOUNTER (OUTPATIENT)
Dept: PHYSICAL THERAPY | Facility: CLINIC | Age: 56
Setting detail: THERAPIES SERIES
End: 2021-05-11
Attending: ORTHOPAEDIC SURGERY
Payer: COMMERCIAL

## 2021-05-11 PROCEDURE — 97140 MANUAL THERAPY 1/> REGIONS: CPT | Mod: GP | Performed by: PHYSICAL THERAPIST

## 2021-05-11 PROCEDURE — 97110 THERAPEUTIC EXERCISES: CPT | Mod: GP | Performed by: PHYSICAL THERAPIST

## 2021-05-17 ENCOUNTER — HOSPITAL ENCOUNTER (OUTPATIENT)
Dept: PHYSICAL THERAPY | Facility: CLINIC | Age: 56
Setting detail: THERAPIES SERIES
End: 2021-05-17
Attending: ORTHOPAEDIC SURGERY
Payer: COMMERCIAL

## 2021-05-17 PROCEDURE — 97140 MANUAL THERAPY 1/> REGIONS: CPT | Mod: GP

## 2021-05-17 PROCEDURE — 97110 THERAPEUTIC EXERCISES: CPT | Mod: GP

## 2021-05-24 ENCOUNTER — HOSPITAL ENCOUNTER (OUTPATIENT)
Dept: PHYSICAL THERAPY | Facility: CLINIC | Age: 56
Setting detail: THERAPIES SERIES
End: 2021-05-24
Attending: ORTHOPAEDIC SURGERY
Payer: COMMERCIAL

## 2021-05-24 PROCEDURE — 97112 NEUROMUSCULAR REEDUCATION: CPT | Mod: GP

## 2021-05-24 PROCEDURE — 97140 MANUAL THERAPY 1/> REGIONS: CPT | Mod: GP

## 2021-05-24 PROCEDURE — 97110 THERAPEUTIC EXERCISES: CPT | Mod: GP

## 2021-06-03 ENCOUNTER — HOSPITAL ENCOUNTER (OUTPATIENT)
Dept: PHYSICAL THERAPY | Facility: CLINIC | Age: 56
Setting detail: THERAPIES SERIES
End: 2021-06-03
Attending: ORTHOPAEDIC SURGERY
Payer: COMMERCIAL

## 2021-06-03 PROCEDURE — 97110 THERAPEUTIC EXERCISES: CPT | Mod: GP

## 2021-06-03 PROCEDURE — 97140 MANUAL THERAPY 1/> REGIONS: CPT | Mod: GP

## 2021-10-12 NOTE — PROGRESS NOTES
"Outpatient Physical Therapy Discharge Note     Patient: Houston Castellon  : 1965    Beginning/End Dates of Reporting Period:  2/3/21o 6/3/21    Referring Provider: Dr. Lionel Bravo MD    Therapy Diagnosis: R LE pain, decreased R ankle ROM, decreased R LE strength, gait deviations, balance deficits, increased muscle tightness     Client Self Report: \"I tried wearing tennis shoes today but didn't last long. There was not enough support.\"    Objective Measurements:  Objective Measure: Pain   Details: 0-1/10 upon arrival     Objective Measure: R ankle AROM   Details: DF: 0, PF: 38       Goals:  Goal Identifier 1   Goal Description Patient will increase LEFS score from 25/80 (32.15%) to at least 72/80 in order to demonstrate at least 90% maximal function in order to facilitate return to prior level of function.     Target Date 21   Date Met      Progress (detail required for progress note):  Last assessed on 21 with score of 53/80 (66.25%)     Goal Identifier 2   Goal Description Patient will increase R ankle DF AROM from -5 deg to at least 15 deg and will increase R ankle PF AROM from 25 deg to at least 25 deg in order to facilitate return to PLOF.    Target Date 21   Date Met      Progress (detail required for progress note):  On 6/3/21, R ankle DF was 0 deg and R ankle PF was 38 deg.      Goal Identifier 3   Goal Description Patient will demonstrate ability to ambulate independently and with normalized, painfree gait pattern in order to facilitate return to PLOF.     Target Date 21   Date Met      Progress (detail required for progress note):  Not assessed          Plan:  Discharge from therapy.  Patient was last seen on 6/3/21 and failed to schedule further appointments after that.      Discharge:    Reason for Discharge: Patient has failed to schedule further appointments.    Equipment Issued: N/A    Discharge Plan: Patient to continue home program.        Samira Bryant PT, " RICARDO CHAPMAN  [facility]  Physical Therapist     Phone: [phone]  Email: [email or web address]